# Patient Record
Sex: FEMALE
[De-identification: names, ages, dates, MRNs, and addresses within clinical notes are randomized per-mention and may not be internally consistent; named-entity substitution may affect disease eponyms.]

---

## 2018-07-16 ENCOUNTER — HOSPITAL ENCOUNTER (INPATIENT)
Dept: HOSPITAL 92 - L&D | Age: 28
LOS: 2 days | Discharge: HOME | End: 2018-07-18
Attending: OBSTETRICS & GYNECOLOGY | Admitting: OBSTETRICS & GYNECOLOGY
Payer: COMMERCIAL

## 2018-07-16 VITALS — BODY MASS INDEX: 24.6 KG/M2

## 2018-07-16 DIAGNOSIS — Z3A.37: ICD-10-CM

## 2018-07-16 LAB
HBSAG INDEX: 0.14 S/CO (ref 0–0.99)
HGB BLD-MCNC: 13.2 G/DL (ref 12–16)
HIV 1/2 INDEX: 0.09 S/CO (ref ?–1)
MCH RBC QN AUTO: 31.7 PG (ref 27–31)
MCV RBC AUTO: 89.2 FL (ref 78–98)
PLATELET # BLD AUTO: 168 THOU/UL (ref 130–400)
RBC # BLD AUTO: 4.16 MILL/UL (ref 4.2–5.4)
SYPHILIS ANTIBODY INDEX: 0.03 S/CO
WBC # BLD AUTO: 13.9 THOU/UL (ref 4.8–10.8)

## 2018-07-16 PROCEDURE — 86850 RBC ANTIBODY SCREEN: CPT

## 2018-07-16 PROCEDURE — 90707 MMR VACCINE SC: CPT

## 2018-07-16 PROCEDURE — 0UQKXZZ REPAIR HYMEN, EXTERNAL APPROACH: ICD-10-PCS | Performed by: OBSTETRICS & GYNECOLOGY

## 2018-07-16 PROCEDURE — 51701 INSERT BLADDER CATHETER: CPT

## 2018-07-16 PROCEDURE — 36415 COLL VENOUS BLD VENIPUNCTURE: CPT

## 2018-07-16 PROCEDURE — 0UQMXZZ REPAIR VULVA, EXTERNAL APPROACH: ICD-10-PCS | Performed by: OBSTETRICS & GYNECOLOGY

## 2018-07-16 PROCEDURE — 85027 COMPLETE CBC AUTOMATED: CPT

## 2018-07-16 PROCEDURE — 86900 BLOOD TYPING SEROLOGIC ABO: CPT

## 2018-07-16 PROCEDURE — 86780 TREPONEMA PALLIDUM: CPT

## 2018-07-16 PROCEDURE — 51702 INSERT TEMP BLADDER CATH: CPT

## 2018-07-16 PROCEDURE — 0HQ9XZZ REPAIR PERINEUM SKIN, EXTERNAL APPROACH: ICD-10-PCS | Performed by: OBSTETRICS & GYNECOLOGY

## 2018-07-16 PROCEDURE — 87389 HIV-1 AG W/HIV-1&-2 AB AG IA: CPT

## 2018-07-16 PROCEDURE — 87340 HEPATITIS B SURFACE AG IA: CPT

## 2018-07-16 PROCEDURE — 86901 BLOOD TYPING SEROLOGIC RH(D): CPT

## 2018-07-16 RX ADMIN — Medication SCH MLS: at 16:38

## 2018-07-16 NOTE — PDOC.OPDEL
OB Operative/Delivery Note


Delivery Dr/Surgeon: Kaitlin Araujo DO 


Pre-Delivery Diagnosis: ruptured membrane


Procedure/Post Delivery Dx: spontaneous vaginal delivery


Weeks gestation: 37


Anesthesia: epidural





- Findings


  ** A


Sex: female


Apgar - 1 min: 8


Apgar - 5 min: 9





- Additional Findings/Plan


Placenta delivered: spontaneous


Repaired Obstetrical Laceration: other (1st degree perineal and right labial/

hymenal)


Estimated blood loss: 200 cc 


Compilations/Other Findings: 


Infant in SE position


Normal appearing placenta 


Post delivery plan: routine recovery

## 2018-07-16 NOTE — PDOC.LDHP
Labor and Delivery H&P


Chief complaint: loss of fluid


HPI: 


29 yo G1 @ 37w1d by LMP c/w 10 week CRL who presented to clinic with c/o SROM @ 

9 am yesterday and ctx. Antepartum course benign.  


Current gestational age (weeks): 37


Dating criteria: last menstrual period


Grav: 1


Para: 0


Current pregnancy complications: none


Abnormal US findings: No


Past Medical History: 


Denies


Current medications: pre-marco antonio vitamins


Previous surgical history: none


Social history: none





- Physical Exam


Vital signs reviewed and normal: yes


General: NAD


Heart: RRR


Lungs: CTAB


Abdomen: gravid


Extremeties: no edema


FHT: category 1 (130s, mod marciano, +accels, no decels)


[place] contractions every: q8 min





- Vaginal Exam


cm dilated: 2 (cephalic)


Effacement: 75%


Station: 0





- OB Labs


Blood type: A


RH: positive


Antibody Screen: negative


HIV: negative


RPR: negative


HEPSAg: negative


1 hour GCT: negative


GBS: positive


Urine drug screen: not done


Rubella: non-immune


Additional Labs: 


NT and SSQ negative 





- Assessment


37w1d IUP


SROM


Latent labor 


GBS +


Rubella non-immune





- Plan


Plan: admit to L&D, labor augmentation if indicated (start pitocin for 

augmentation), GBS antibiotic prophylaxis, informed consent obtained, 

anesthesia consult for pain management


-: 


MMR PP

## 2018-07-16 NOTE — PDOC.LDPN
Labor & Delivery Progress Note





- Subjective


Subjective: vaginal pressure





- Objective


Vital signs reviewed and normal: yes


General: NAD, resting


Uterine fundus: non tender


SVE: cephalic


Dilation: 4


Effacement: 90%


Station: 0


FHT: category 1 (130s, mod marciano, +accels, no decels )


[place] contractions every: q2-3 min when assessing correctly 





- Assessment


(1) 37 weeks gestation of pregnancy


Code(s): Z3A.37 - 37 WEEKS GESTATION OF PREGNANCY   Current Visit: Yes   Status

: Acute   





(2) Group B streptococcal carriage complicating pregnancy


Code(s): O99.820 - STREPTOCOCCUS B CARRIER STATE COMPLICATING PREGNANCY   

Current Visit: Yes   Status: Acute   





(3) SROM (spontaneous rupture of membranes)


Code(s): SUY7357 -    Current Visit: Yes   Status: Acute   


Plan: continue plan of care, pitocin for augmentation, other (PCN for GBS PPX )

## 2018-07-17 RX ADMIN — DOCUSATE CALCIUM SCH MG: 240 CAPSULE, LIQUID FILLED ORAL at 21:34

## 2018-07-17 RX ADMIN — DOCUSATE CALCIUM SCH MG: 240 CAPSULE, LIQUID FILLED ORAL at 08:54

## 2018-07-17 RX ADMIN — Medication SCH: at 00:36

## 2018-07-17 NOTE — PDOC.PP
Post Partum Progress Note


Post Partum Day #: 1


Subjective: 





doing well, nursing, min lochia


PO intake tolerated: yes


Flatus: yes


Ambulation: yes


 Vital Signs (12 hours)











  Temp Pulse Resp BP BP


 


 07/17/18 04:20  97.8 F  83  16   106/55 L


 


 07/17/18 00:50  98.0 F  71  16  108/54 L 


 


 07/16/18 23:50  97.9 F  65  16  106/61 








 Weight











Weight                         148 lb

















- Physical Examination


General: NAD


Respiratory: clear to auscultation bilaterally


Extremities: negative homans (B)


Skin: no rash


Neurological: no gross focal deficits


Psychiatric: A&Ox3, normal affect


Result Diagrams: 


 07/16/18 12:17





Additional Labs: 


 Post Partum Labs











Blood Type  A POSITIVE   07/16/18  12:17    


 


Hep Bs Antigen  Non-Reactive S/CO (NonReactive)   07/16/18  12:17    











(1) Vaginal delivery


Code(s): O80 - ENCOUNTER FOR FULL-TERM UNCOMPLICATED DELIVERY   Status: Acute   





(2) 37 weeks gestation of pregnancy


Code(s): Z3A.37 - 37 WEEKS GESTATION OF PREGNANCY   Status: Acute   





- Assessment/Plan





PPD1, TSVD, uncomplicated.  Cont PP care.

## 2018-07-18 VITALS — TEMPERATURE: 97.9 F | DIASTOLIC BLOOD PRESSURE: 68 MMHG | SYSTOLIC BLOOD PRESSURE: 101 MMHG

## 2018-07-18 RX ADMIN — DOCUSATE CALCIUM SCH MG: 240 CAPSULE, LIQUID FILLED ORAL at 09:39

## 2018-07-18 NOTE — PDOC.PP
Post Partum Progress Note


Post Partum Day #: 2


Subjective: 


No concerns. Breast feeding. Minimal lochia and pain. 


PO intake tolerated: yes


Flatus: yes


Ambulation: yes


 Weight











Weight                         148 lb

















- Physical Examination


General: NAD


Cardiovascular: no m/r/g, RRR


Respiratory: non-labored breathing


Abdominal: no distention, appropriately TTP


Fundus firm & at: below umbilicus 


Extremities: negative homans (B)


Neurological: no gross focal deficits


Psychiatric: A&Ox3


Result Diagrams: 


 07/16/18 12:17





Additional Labs: 


 Post Partum Labs











Blood Type  A POSITIVE   07/16/18  12:17    


 


Hep Bs Antigen  Non-Reactive S/CO (NonReactive)   07/16/18  12:17    











(1) 37 weeks gestation of pregnancy


Code(s): Z3A.37 - 37 WEEKS GESTATION OF PREGNANCY   Status: Resolved   





(2) Group B streptococcal carriage complicating pregnancy


Code(s): O99.820 - STREPTOCOCCUS B CARRIER STATE COMPLICATING PREGNANCY   Status

: Resolved   





(3) SROM (spontaneous rupture of membranes)


Code(s): [id number] -    Status: Resolved   





(4) Vaginal delivery


Code(s): O80 - ENCOUNTER FOR FULL-TERM UNCOMPLICATED DELIVERY   Status: Acute   





- Assessment/Plan


PPD 2


VSSAF


D/C home today with infant